# Patient Record
Sex: FEMALE | Race: WHITE | NOT HISPANIC OR LATINO | Employment: OTHER | URBAN - METROPOLITAN AREA
[De-identification: names, ages, dates, MRNs, and addresses within clinical notes are randomized per-mention and may not be internally consistent; named-entity substitution may affect disease eponyms.]

---

## 2018-08-12 ENCOUNTER — HOSPITAL ENCOUNTER (OUTPATIENT)
Facility: MEDICAL CENTER | Age: 78
DRG: 281 | End: 2018-08-12
Payer: MEDICARE

## 2018-08-12 ENCOUNTER — HOSPITAL ENCOUNTER (INPATIENT)
Facility: MEDICAL CENTER | Age: 78
LOS: 2 days | DRG: 281 | End: 2018-08-14
Attending: FAMILY MEDICINE | Admitting: FAMILY MEDICINE
Payer: MEDICARE

## 2018-08-12 PROBLEM — N18.9 ACUTE ON CHRONIC KIDNEY FAILURE (HCC): Status: ACTIVE | Noted: 2018-08-12

## 2018-08-12 PROBLEM — I21.4 NSTEMI (NON-ST ELEVATED MYOCARDIAL INFARCTION) (HCC): Status: ACTIVE | Noted: 2018-08-12

## 2018-08-12 PROBLEM — N17.9 ACUTE ON CHRONIC KIDNEY FAILURE (HCC): Status: ACTIVE | Noted: 2018-08-12

## 2018-08-12 PROBLEM — K56.609 SBO (SMALL BOWEL OBSTRUCTION) (HCC): Status: ACTIVE | Noted: 2018-08-12

## 2018-08-12 PROBLEM — I10 ESSENTIAL HYPERTENSION: Status: ACTIVE | Noted: 2018-08-12

## 2018-08-12 PROBLEM — E66.09 CLASS 2 OBESITY DUE TO EXCESS CALORIES WITHOUT SERIOUS COMORBIDITY WITH BODY MASS INDEX (BMI) OF 37.0 TO 37.9 IN ADULT: Status: ACTIVE | Noted: 2018-08-12

## 2018-08-12 LAB
APPEARANCE UR: CLEAR
APTT PPP: 115.3 SEC (ref 24.7–36)
APTT PPP: 168.4 SEC (ref 24.7–36)
APTT PPP: 40.3 SEC (ref 24.7–36)
BILIRUB UR QL STRIP.AUTO: NEGATIVE
COLOR UR: YELLOW
EKG IMPRESSION: NORMAL
EST. AVERAGE GLUCOSE BLD GHB EST-MCNC: 114 MG/DL
GLUCOSE UR STRIP.AUTO-MCNC: NEGATIVE MG/DL
HBA1C MFR BLD: 5.6 % (ref 0–5.6)
KETONES UR STRIP.AUTO-MCNC: NEGATIVE MG/DL
LEUKOCYTE ESTERASE UR QL STRIP.AUTO: NEGATIVE
MICRO URNS: NORMAL
NITRITE UR QL STRIP.AUTO: NEGATIVE
PH UR STRIP.AUTO: 5.5 [PH]
PROT UR QL STRIP: NEGATIVE MG/DL
RBC UR QL AUTO: NEGATIVE
SODIUM UR-SCNC: 126 MMOL/L
SP GR UR STRIP.AUTO: 1.01
TROPONIN I SERPL-MCNC: 0.36 NG/ML (ref 0–0.04)
TROPONIN I SERPL-MCNC: 0.36 NG/ML (ref 0–0.04)
TROPONIN I SERPL-MCNC: 0.47 NG/ML (ref 0–0.04)
UROBILINOGEN UR STRIP.AUTO-MCNC: 0.2 MG/DL

## 2018-08-12 PROCEDURE — 93010 ELECTROCARDIOGRAM REPORT: CPT | Performed by: INTERNAL MEDICINE

## 2018-08-12 PROCEDURE — A9270 NON-COVERED ITEM OR SERVICE: HCPCS | Performed by: INTERNAL MEDICINE

## 2018-08-12 PROCEDURE — 83036 HEMOGLOBIN GLYCOSYLATED A1C: CPT

## 2018-08-12 PROCEDURE — 81003 URINALYSIS AUTO W/O SCOPE: CPT | Mod: 91

## 2018-08-12 PROCEDURE — 700111 HCHG RX REV CODE 636 W/ 250 OVERRIDE (IP): Performed by: HOSPITALIST

## 2018-08-12 PROCEDURE — 84484 ASSAY OF TROPONIN QUANT: CPT | Mod: 91

## 2018-08-12 PROCEDURE — 700102 HCHG RX REV CODE 250 W/ 637 OVERRIDE(OP): Performed by: INTERNAL MEDICINE

## 2018-08-12 PROCEDURE — 770020 HCHG ROOM/CARE - TELE (206)

## 2018-08-12 PROCEDURE — 84300 ASSAY OF URINE SODIUM: CPT

## 2018-08-12 PROCEDURE — 36415 COLL VENOUS BLD VENIPUNCTURE: CPT

## 2018-08-12 PROCEDURE — A9270 NON-COVERED ITEM OR SERVICE: HCPCS | Performed by: HOSPITALIST

## 2018-08-12 PROCEDURE — 85730 THROMBOPLASTIN TIME PARTIAL: CPT

## 2018-08-12 PROCEDURE — 700102 HCHG RX REV CODE 250 W/ 637 OVERRIDE(OP): Performed by: HOSPITALIST

## 2018-08-12 PROCEDURE — 99223 1ST HOSP IP/OBS HIGH 75: CPT | Mod: AI | Performed by: HOSPITALIST

## 2018-08-12 PROCEDURE — 93005 ELECTROCARDIOGRAM TRACING: CPT | Performed by: INTERNAL MEDICINE

## 2018-08-12 PROCEDURE — 93306 TTE W/DOPPLER COMPLETE: CPT | Mod: 26 | Performed by: INTERNAL MEDICINE

## 2018-08-12 RX ORDER — ALLOPURINOL 300 MG/1
300 TABLET ORAL DAILY
Status: DISCONTINUED | OUTPATIENT
Start: 2018-08-12 | End: 2018-08-14 | Stop reason: HOSPADM

## 2018-08-12 RX ORDER — AMOXICILLIN 250 MG
2 CAPSULE ORAL 2 TIMES DAILY
Status: DISCONTINUED | OUTPATIENT
Start: 2018-08-12 | End: 2018-08-14 | Stop reason: HOSPADM

## 2018-08-12 RX ORDER — CALCITRIOL 0.25 UG/1
0.25 CAPSULE, LIQUID FILLED ORAL DAILY
Status: DISCONTINUED | OUTPATIENT
Start: 2018-08-12 | End: 2018-08-14 | Stop reason: HOSPADM

## 2018-08-12 RX ORDER — MAGNESIUM GLUCONATE 27 MG(500)
500 TABLET ORAL DAILY
COMMUNITY

## 2018-08-12 RX ORDER — ATORVASTATIN CALCIUM 40 MG/1
40 TABLET, FILM COATED ORAL EVERY EVENING
Status: DISCONTINUED | OUTPATIENT
Start: 2018-08-12 | End: 2018-08-14 | Stop reason: HOSPADM

## 2018-08-12 RX ORDER — POLYETHYLENE GLYCOL 3350 17 G/17G
1 POWDER, FOR SOLUTION ORAL
Status: DISCONTINUED | OUTPATIENT
Start: 2018-08-12 | End: 2018-08-14 | Stop reason: HOSPADM

## 2018-08-12 RX ORDER — HEPARIN SODIUM 1000 [USP'U]/ML
6000 INJECTION, SOLUTION INTRAVENOUS; SUBCUTANEOUS ONCE
Status: COMPLETED | OUTPATIENT
Start: 2018-08-12 | End: 2018-08-12

## 2018-08-12 RX ORDER — ACETAMINOPHEN 325 MG/1
650 TABLET ORAL EVERY 6 HOURS PRN
Status: DISCONTINUED | OUTPATIENT
Start: 2018-08-12 | End: 2018-08-14 | Stop reason: HOSPADM

## 2018-08-12 RX ORDER — ONDANSETRON 2 MG/ML
4 INJECTION INTRAMUSCULAR; INTRAVENOUS EVERY 4 HOURS PRN
Status: DISCONTINUED | OUTPATIENT
Start: 2018-08-12 | End: 2018-08-14 | Stop reason: HOSPADM

## 2018-08-12 RX ORDER — AMLODIPINE BESYLATE 5 MG/1
5 TABLET ORAL DAILY
Status: ON HOLD | COMMUNITY
End: 2018-08-13

## 2018-08-12 RX ORDER — TORSEMIDE 5 MG/1
5 TABLET ORAL DAILY
Status: DISCONTINUED | OUTPATIENT
Start: 2018-08-12 | End: 2018-08-12

## 2018-08-12 RX ORDER — HEPARIN SODIUM 1000 [USP'U]/ML
3200 INJECTION, SOLUTION INTRAVENOUS; SUBCUTANEOUS PRN
Status: DISCONTINUED | OUTPATIENT
Start: 2018-08-12 | End: 2018-08-13

## 2018-08-12 RX ORDER — SODIUM CHLORIDE 9 MG/ML
INJECTION, SOLUTION INTRAVENOUS CONTINUOUS
Status: DISCONTINUED | OUTPATIENT
Start: 2018-08-12 | End: 2018-08-14

## 2018-08-12 RX ORDER — BISACODYL 10 MG
10 SUPPOSITORY, RECTAL RECTAL
Status: DISCONTINUED | OUTPATIENT
Start: 2018-08-12 | End: 2018-08-14 | Stop reason: HOSPADM

## 2018-08-12 RX ORDER — ONDANSETRON 4 MG/1
4 TABLET, ORALLY DISINTEGRATING ORAL EVERY 4 HOURS PRN
Status: DISCONTINUED | OUTPATIENT
Start: 2018-08-12 | End: 2018-08-14 | Stop reason: HOSPADM

## 2018-08-12 RX ORDER — AMLODIPINE BESYLATE 5 MG/1
5 TABLET ORAL DAILY
Status: DISCONTINUED | OUTPATIENT
Start: 2018-08-12 | End: 2018-08-13

## 2018-08-12 RX ADMIN — HEPARIN SODIUM 1200 UNITS/HR: 5000 INJECTION, SOLUTION INTRAVENOUS at 08:42

## 2018-08-12 RX ADMIN — ATORVASTATIN CALCIUM 40 MG: 40 TABLET, FILM COATED ORAL at 18:26

## 2018-08-12 RX ADMIN — HEPARIN SODIUM 6000 UNITS: 1000 INJECTION, SOLUTION INTRAVENOUS; SUBCUTANEOUS at 08:42

## 2018-08-12 RX ADMIN — AMLODIPINE BESYLATE 5 MG: 5 TABLET ORAL at 06:32

## 2018-08-12 RX ADMIN — ALLOPURINOL 300 MG: 300 TABLET ORAL at 06:32

## 2018-08-12 RX ADMIN — TORSEMIDE 5 MG: 5 TABLET ORAL at 06:32

## 2018-08-12 RX ADMIN — CALCITRIOL 0.25 MCG: 0.25 CAPSULE, LIQUID FILLED ORAL at 06:32

## 2018-08-12 RX ADMIN — ASPIRIN 81 MG: 81 TABLET, DELAYED RELEASE ORAL at 12:52

## 2018-08-12 ASSESSMENT — LIFESTYLE VARIABLES
ALCOHOL_USE: YES
EVER HAD A DRINK FIRST THING IN THE MORNING TO STEADY YOUR NERVES TO GET RID OF A HANGOVER: NO
ON A TYPICAL DAY WHEN YOU DRINK ALCOHOL HOW MANY DRINKS DO YOU HAVE: 1
TOTAL SCORE: 0
HAVE YOU EVER FELT YOU SHOULD CUT DOWN ON YOUR DRINKING: NO
TOTAL SCORE: 0
HAVE PEOPLE ANNOYED YOU BY CRITICIZING YOUR DRINKING: NO
EVER FELT BAD OR GUILTY ABOUT YOUR DRINKING: NO
AVERAGE NUMBER OF DAYS PER WEEK YOU HAVE A DRINK CONTAINING ALCOHOL: 1
HOW MANY TIMES IN THE PAST YEAR HAVE YOU HAD 5 OR MORE DRINKS IN A DAY: 0
CONSUMPTION TOTAL: INCOMPLETE
HAVE PEOPLE ANNOYED YOU BY CRITICIZING YOUR DRINKING: NO
TOTAL SCORE: 0
EVER FELT BAD OR GUILTY ABOUT YOUR DRINKING: NO
TOTAL SCORE: 0
EVER_SMOKED: YES
CONSUMPTION TOTAL: NEGATIVE
ALCOHOL_USE: YES
TOTAL SCORE: 0
EVER HAD A DRINK FIRST THING IN THE MORNING TO STEADY YOUR NERVES TO GET RID OF A HANGOVER: NO
TOTAL SCORE: 0
HAVE YOU EVER FELT YOU SHOULD CUT DOWN ON YOUR DRINKING: NO

## 2018-08-12 ASSESSMENT — COGNITIVE AND FUNCTIONAL STATUS - GENERAL
SUGGESTED CMS G CODE MODIFIER MOBILITY: CK
DAILY ACTIVITIY SCORE: 20
DRESSING REGULAR LOWER BODY CLOTHING: A LITTLE
STANDING UP FROM CHAIR USING ARMS: A LITTLE
MOVING FROM LYING ON BACK TO SITTING ON SIDE OF FLAT BED: A LITTLE
CLIMB 3 TO 5 STEPS WITH RAILING: A LITTLE
TURNING FROM BACK TO SIDE WHILE IN FLAT BAD: A LITTLE
MOVING TO AND FROM BED TO CHAIR: A LITTLE
DRESSING REGULAR UPPER BODY CLOTHING: A LITTLE
SUGGESTED CMS G CODE MODIFIER DAILY ACTIVITY: CJ
TOILETING: A LITTLE
HELP NEEDED FOR BATHING: A LITTLE
MOBILITY SCORE: 18
WALKING IN HOSPITAL ROOM: A LITTLE

## 2018-08-12 ASSESSMENT — ENCOUNTER SYMPTOMS
VOMITING: 0
RESPIRATORY NEGATIVE: 1
MUSCULOSKELETAL NEGATIVE: 1
ABDOMINAL PAIN: 0
PSYCHIATRIC NEGATIVE: 1
CONSTIPATION: 0
SHORTNESS OF BREATH: 0
NEUROLOGICAL NEGATIVE: 1
DIARRHEA: 0
NAUSEA: 0

## 2018-08-12 ASSESSMENT — PAIN SCALES - GENERAL
PAINLEVEL_OUTOF10: 0

## 2018-08-12 ASSESSMENT — PATIENT HEALTH QUESTIONNAIRE - PHQ9
2. FEELING DOWN, DEPRESSED, IRRITABLE, OR HOPELESS: NOT AT ALL
1. LITTLE INTEREST OR PLEASURE IN DOING THINGS: NOT AT ALL
SUM OF ALL RESPONSES TO PHQ9 QUESTIONS 1 AND 2: 0

## 2018-08-12 NOTE — ASSESSMENT & PLAN NOTE
With no current nausea or vomiting.  We will hold NG tube, occasions.  Monitor.  Intravenous fluids.

## 2018-08-12 NOTE — PROGRESS NOTES
Pt arrived from [place] via EMS at ~0450. Report received from George PARDO. Pt A/Ox4. Denies CP, abd pain, N/V, dizziness, and SOB. VSS. Reviewed safety precautions. Call light within reach and bed alarm on.

## 2018-08-12 NOTE — CARE PLAN
Problem: Communication  Goal: The ability to communicate needs accurately and effectively will improve  Outcome: PROGRESSING AS EXPECTED  Pt able to make needs known.    Problem: Safety  Goal: Will remain free from falls  Outcome: PROGRESSING AS EXPECTED  Pt had no falls this shift.

## 2018-08-12 NOTE — H&P
Hospital Medicine History & Physical Note    Date of Service  8/12/2018    Primary Care Physician  Pcp Pt States None    Consultants  Cardiology    Code Status  Full code     Chief Complaint  Chest pain    History of Presenting Illness  77 y.o. 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  She reports the onset of not feeling well, which she describes as a chest pressure in the center of her chest, without radiation.  She denies any exacerbating or alleviating factors.  She had no nausea or vomiting.  Due to the ongoing feelings, she presented to an outside facility, where she was evaluated.  At that time, she was noted to have an elevated troponin, and requiring cardiology consultation.  She was subsequently sent to this facility for higher level of care and cardiology consultation.    Review of Systems  Review of Systems   Constitutional: Positive for malaise/fatigue.   HENT: Negative.    Respiratory: Negative.  Negative for shortness of breath.    Cardiovascular: Positive for chest pain.   Gastrointestinal: Negative for abdominal pain, constipation, diarrhea, nausea and vomiting.   Genitourinary: Negative.    Musculoskeletal: Negative.    Skin: Negative.    Neurological: Negative.    Endo/Heme/Allergies: Negative.    Psychiatric/Behavioral: Negative.        Past Medical History   has a past medical history of Cancer (HCC) and Renal disorder.    Surgical History   has a past surgical history that includes other; gyn surgery; hernia repair; other abdominal surgery; appendectomy; and cholecystectomy.     Family History  family history includes Cancer in her mother; Heart Attack in her father; Heart Disease in her father.     Social History   reports that she has quit smoking. She does not have any  smokeless tobacco history on file. She reports that she drinks alcohol. She reports that she does not use drugs.    Allergies  Allergies   Allergen Reactions   • Contrast Media With Iodine [Iodine] Swelling     Angioedema   • Penicillins Rash     Rash     • Codeine Rash     Rash     • Tape Rash     Rash         Medications  Prior to Admission Medications   Prescriptions Last Dose Informant Patient Reported? Taking?   allopurinol (ZYLOPRIM) 300 MG Tab 8/11/2018 at Unknown time  Yes No   Sig: Take 300 mg by mouth every day.   amLODIPine (NORVASC) 5 MG Tab 8/12/2018 at 0200  Yes Yes   Sig: Take 5 mg by mouth every day.   calcitRIOL (ROCALTROL) 0.25 MCG Cap 8/11/2018 at Unknown time  Yes No   Sig: Take 0.25 mcg by mouth every day.   cephALEXin (KEFLEX) 250 MG Cap 8/11/2018 at Unknown time  Yes No   Sig: Take 250 mg by mouth every day.   diphenoxylate-atropine (LOMOTIL) 2.5-0.025 MG Tab 8/11/2018 at Unknown time  Yes No   Sig: Take 2 Tabs by mouth 4 times a day as needed for Diarrhea.   magnesium gluconate (MAG-G) 500 MG tablet 8/3/18  Yes Yes   Sig: Take 500 mg by mouth every day.   torsemide (DEMADEX) 5 MG Tab 8/11/2018 at Unknown time  Yes No   Sig: Take 5 mg by mouth every day.      Facility-Administered Medications: None       Physical Exam  Blood Pressure : 158/70   Temperature: 36.2 °C (97.2 °F)   Pulse: 70   Respiration: 18   Pulse Oximetry: 98 %     Physical Exam   Constitutional: She is oriented to person, place, and time. She appears well-developed and well-nourished. No distress.   HENT:   Head: Normocephalic and atraumatic.   Eyes: Pupils are equal, round, and reactive to light. Conjunctivae are normal.   Neck: Normal range of motion. Neck supple. No tracheal deviation present. No thyromegaly present.   Cardiovascular: Normal rate, regular rhythm and normal heart sounds.  Exam reveals no gallop and no friction rub.    No murmur heard.  Pulmonary/Chest: Effort normal and breath sounds normal. No respiratory  distress. She has no wheezes. She has no rales.   Abdominal: Soft. Bowel sounds are normal. She exhibits no distension. There is no tenderness. There is no rebound.   Musculoskeletal: Normal range of motion. She exhibits no edema.   Lymphadenopathy:     She has no cervical adenopathy.   Neurological: She is alert and oriented to person, place, and time. No cranial nerve deficit.   Skin: Skin is warm and dry. She is not diaphoretic.   Psychiatric: She has a normal mood and affect.   Nursing note and vitals reviewed.      Laboratory:  Recent Labs      08/11/18 2150   WBC  10.4   RBC  4.27   HEMOGLOBIN  13.1   HEMATOCRIT  39.8   MCV  93.2   MCH  30.7   MCHC  32.9*   RDW  14.6*   PLATELETCT  215   MPV  10.9*     Recent Labs      08/11/18 2150   SODIUM  144   POTASSIUM  4.3   CHLORIDE  110*   CO2  26   GLUCOSE  136*   BUN  56*   CREATININE  2.7*   CALCIUM  9.0     Recent Labs      08/11/18 2150   ALTSGPT  17   ASTSGOT  16   ALKPHOSPHAT  55   TBILIRUBIN  0.3   LIPASE  155   GLUCOSE  136*                 Lab Results   Component Value Date    TROPONINI 1.11 () 08/12/2018       Urinalysis:    Recent Labs      08/12/18   0000   SPECGRAVITY  1.015   GLUCOSEUR  NEGATIVE   KETONES  NEGATIVE   NITRITE  NEGATIVE   LEUKESTERAS  NEGATIVE   WBCURINE  Rare   RBCURINE  0-2   BACTERIA  None Seen   EPITHELCELL  None Seen        Imaging:    Computed tomographic the abdomen and pelvis from outside psych facility  With findings consistent with a partial small bowel obstruction less likely enteritis, trace air noted in bladder which may be due to recent instrumentation infection or other etiology.  Minimal prominence the left adrenal gland likely due to hyperplasia surgically removed right kidney and adrenal gland, surgical cholecystectomy      Assessment/Plan:  I anticipate this patient will require at least two midnights for appropriate medical management, necessitating inpatient admission.    Acute on chronic kidney failure (HCC)    Assessment & Plan    Concerning given single kidney.  This may be the cause of the elevated troponin as well.  We will monitor.  Check urine studies to verify acuity.  Of note, patient is on diuretics so will obtain Feurea        Class 2 obesity due to excess calories without serious comorbidity with body mass index (BMI) of 37.0 to 37.9 in adult   Assessment & Plan    Body mass index is 37.1 kg/m².          Essential hypertension   Assessment & Plan    Controlled with current medication regimen.  Monitor.        SBO (small bowel obstruction) ([facility])   Assessment & Plan    With no current nausea or vomiting.  We will hold NG tube, occasions.  Monitor.  Intravenous fluids.        NSTEMI (non-ST elevated myocardial infarction) ([facility])   Assessment & Plan    Concern for the same, elevated troponin of 1.11, from previous, with chest pain yesterday.  Plan for heparin infusion pending cardiology consultation.            VTE prophylaxis: SCD, heparin infusion

## 2018-08-12 NOTE — CONSULTS
Cardiology Consult Note    Date & Time note created:    8/12/2018   8:32 AM       Patient ID:   Name:             Yari Gonzalez   YOB: 1940  Age:                 77 y.o.  female   MRN:               [id number]               Referring Physician: Dr. Morfin                                                  Reason for Consult:      NSTEMI    History of Present Illness:    77-year-old female with no prior cardiac history who was transferred from outside hospital for management of NSTEMI.  Patient is currently visiting from [facility].  She was driving around [place] when she started having substernal nonradiating chest pressure associated with nausea.  She then went to her hotel room where she had emesis ×1.  After that patient states that her  noted that she was choking and due to worsening symptoms, he decided to call 911.  Her nausea and chest pressure have since resolved.  She has never had these symptoms in the past.    She has a history of endometrial cancer for which she received radiation.  She has also had multiple surgeries all in the 1970s and 80s.  She also has a solitary kidney as she has previously had a nephrectomy.    Review of Systems:      A full review of systems was completed and all pertinent positives and negatives are included in the HPI above. All others reviewed and negative.     Past Medical History:   Past Medical History:   Diagnosis Date   • Cancer (HCC)     intrametrium, with mets   • Renal disorder     1 kidney       Past Surgical History:  Past Surgical History:   Procedure Laterality Date   • APPENDECTOMY     • CHOLECYSTECTOMY     • GYN SURGERY      hysterectomy   • HERNIA REPAIR     • OTHER      right kindey removed   • OTHER ABDOMINAL SURGERY         Family History:  Family History   Problem Relation Age of Onset   • Cancer Mother    • Heart Disease Father    • Heart Attack Father        Social History:  Social History     Social History   •  Marital status:      Spouse name: N/A   • Number of children: N/A   • Years of education: N/A     Occupational History   • Not on file.     Social History Main Topics   • Smoking status: Former Smoker   • Smokeless tobacco: Never Used   • Alcohol use Yes      Comment: rare   • Drug use: No   • Sexual activity: Not on file     Other Topics Concern   • Not on file     Social History Narrative   • No narrative on file   Occasional alcohol use.  No recreational drug use.    Hospital Medications:    Current Facility-Administered Medications:   •  allopurinol (ZYLOPRIM) tablet 300 mg, 300 mg, Oral, DAILY, Bryan Morfin M.D., 300 mg at 08/12/18 0632  •  amLODIPine (NORVASC) tablet 5 mg, 5 mg, Oral, DAILY, Bryan Morfin M.D., 5 mg at 08/12/18 0632  •  calcitRIOL (ROCALTROL) capsule 0.25 mcg, 0.25 mcg, Oral, DAILY, Bryan Morfin M.D., 0.25 mcg at 08/12/18 0632  •  ondansetron (ZOFRAN) syringe/vial injection 4 mg, 4 mg, Intravenous, Q4HRS PRN, Bryan Morfin M.D.  •  ondansetron (ZOFRAN ODT) dispertab 4 mg, 4 mg, Oral, Q4HRS PRN, Bryan Morfin M.D.  •  senna-docusate (PERICOLACE or SENOKOT S) 8.6-50 MG per tablet 2 Tab, 2 Tab, Oral, BID **AND** polyethylene glycol/lytes (MIRALAX) PACKET 1 Packet, 1 Packet, Oral, QDAY PRN **AND** magnesium hydroxide (MILK OF MAGNESIA) suspension 30 mL, 30 mL, Oral, QDAY PRN **AND** bisacodyl (DULCOLAX) suppository 10 mg, 10 mg, Rectal, QDAY PRN, Bryan Morfin M.D.  •  acetaminophen (TYLENOL) tablet 650 mg, 650 mg, Oral, Q6HRS PRN, Bryan Morfin M.D.  •  MD ALERT...HEPARIN WEIGHT BASED PROTOCOL Pharmacist to implement, , Other, PRN, Bryan Morfin M.D.  •  NS infusion, , Intravenous, Continuous, Bryan Morfin M.D.  •  heparin injection 6,000 Units, 6,000 Units, Intravenous, Once **AND** heparin injection 3,200 Units, 3,200 Units, Intravenous, PRN **AND** heparin infusion 25,000 units in 500 ml 0.45% nacl, , Intravenous, Continuous **AND** Protocol 440 Heparin Weight Based  "DO NOT GIVE ANY HEPARIN BOLUS TO STROKE PATIENT, , , CONTINUOUS **AND** Protocol 440 Heparin Weight Based Discontinue Enoxaparin (Lovenox), Dabigatran (Pradaxa), Rivaroxaban (Xarelto), Apixaban (Eliquis), Edoxaban (Savaysa, Lixiana), Fondaparinux (Arixtra) and Argatroban prior to heparin administration, , , CONTINUOUS **AND** Protocol 440 Heparin Weight Based Draw baseline aPTT, PT, and platelet count if not already done, , , CONTINUOUS **AND** Protocol 440 Heparin Weight Based Draw aPTT 6 hours after beginning infusion. , , , CONTINUOUS **AND** Protocol 440 Heparin Weight Based Draw Platelet count every three days. Contact MD if platelet is 50% lower than baseline count., , , CONTINUOUS **AND** Protocol 440 Heparin Weight Based Record Patient Data, , , CONTINUOUS **AND** Protocol 440 Heparin Weight Based INSTRUCTIONS, , , CONTINUOUS **AND** Protocol 440 Heparin Weight Based Review aPTT results 6 hours after infusion is begun as detailed, , , CONTINUOUS **AND** Protocol 440 Heparin Weight Based Adjust heparin to maintain aPTT between 55-96 sec, , , CONTINUOUS **AND** Protocol 440 Heparin Weight Based Order aPTT 6 hours after any rate change or hold until aPTT is therapeutic (55-96 seconds), , , CONTINUOUS **AND** Protocol 440 Heparin Weight Based Documentation and verification, , , CONTINUOUS, Bryan Morfin M.D.  •  pneumococcal vaccine (PNEUMOVAX-23) injection 25 mcg, 0.5 mL, Intramuscular, Once PRN, Bryan Morfin M.D.    Medication Allergy:  Allergies   Allergen Reactions   • Contrast Media With Iodine [Iodine] Swelling     Angioedema   • Penicillins Rash     Rash     • Codeine Rash     Rash     • Tape Rash     Rash         Physical Exam:  Vitals/ General Appearance:   Weight/BMI: Body mass index is 37.1 kg/m².  Blood pressure 158/70, pulse 70, temperature 36.2 °C (97.2 °F), resp. rate 18, height 1.6 m (5' 3\"), weight 95 kg (209 lb 7 oz), SpO2 98 %, not currently breastfeeding.  Vitals:    08/12/18 0437 " "08/12/18 0627   BP: 158/70    Pulse: 70    Resp: 18    Temp: 36.2 °C (97.2 °F)    SpO2: 98%    Weight:  95 kg (209 lb 7 oz)   Height:  1.6 m (5' 3\")       Constitutional:   Well developed, Well nourished, No acute distress   HEENT:  Normocephalic, Atraumatic  Eyes: Conjunctiva normal  Neck:  Normal range of motion, no JVD.  Cardiovascular:  Normal heart rate, Normal rhythm, No murmurs, No rubs, No gallops. Extremities with intact distal pulses, no cyanosis, or edema.   Lungs:  Normal breath sounds, breath sounds clear to auscultation bilaterally,  no crackles, no wheezing.   Abdomen:  Soft, No tenderness  Skin: No rash  Neurologic: Alert & oriented x 3   Psychiatric: Affect normal     MDM (Data Review):     Records reviewed and summarized in current documentation    Lab Data Review:  Recent Labs      08/11/18   2150   WBC  10.4   RBC  4.27   HEMOGLOBIN  13.1   HEMATOCRIT  39.8   MCV  93.2   MCH  30.7   MCHC  32.9*   RDW  14.6*   PLATELETCT  215   MPV  10.9*     Recent Labs      08/11/18   2150   SODIUM  144   POTASSIUM  4.3   CHLORIDE  110*   CO2  26   GLUCOSE  136*   BUN  56*   CREATININE  2.7*   CALCIUM  9.0     Recent Labs      08/11/18 2150  08/12/18   0015   TROPONINI  1.02*  1.11*       Labs reviewed as noted above.  Creatinine 2.7, stable from 2011.    Imaging/Procedures Review:      EKG: Performed on admission was personally reviewed and showed sinus rhythm at 66 bpm.  Q waves in III and aVF.  No ischemic ST-T wave changes.    MDM (Assessment and Plan):     Active Hospital Problems    Diagnosis   • NSTEMI (non-ST elevated myocardial infarction) ([facility]) [I21.4]   • SBO (small bowel obstruction) ([facility]) [K56.609]   • Essential hypertension [I10]   • Class 2 obesity due to excess calories without serious comorbidity with body mass index (BMI) of 37.0 to 37.9 in adult [E66.09, Z68.37]   • Acute on chronic kidney failure (HCC) [N17.9, N18.9]     NSTEMI: Patient is currently symptom-free.  EKG without any ischemic " changes.  NSTEMI could be type I versus type II, unclear from history.  Troponin only mildly elevated.  She has baseline CKD and her creatinine is around 2.7.  I would recommend a myocardial perfusion study tomorrow morning to evaluate for ischemia.  If she has a large reversible defect on the above study, we will need to discuss risks and benefits of a coronary angiogram given her CKD.  Otherwise would recommend medical management only.  She is currently on heparin drip.  Will start aspirin and a statin as well.  Myocardial perfusion study ordered for tomorrow.  Patient will be n.p.o. after midnight.  An echocardiogram is also been ordered to evaluate her LV function.    Will follow    Thank you for allowing me to participate in the care of this patient. Please do not hesitate to contact me with any questions.    Marissa Ramirez MD  Cardiologist  [facility]

## 2018-08-12 NOTE — ASSESSMENT & PLAN NOTE
Concern for the same, elevated troponin of 1.11, from previous, with chest pain yesterday.  Plan for heparin infusion pending cardiology consultation.  8/13:  dc'd heparin drip, dc home per Dr. Hdez with medical management of NSTEMI.  Asa, lipitor, norvasc, coreg.  SR 73-75.  Echo eF 55%, trops trended down.  No further chest pressure.

## 2018-08-12 NOTE — PROGRESS NOTES
Direct admit from [facility], Dr. Eid, [phone].  Accepted by Dr. Conner for NSTEMI and SBO.  ADT signed & held @ 5103, needs to be released upon pt arrival.  No written orders received.  Pt coming by ground EMS.

## 2018-08-12 NOTE — ASSESSMENT & PLAN NOTE
Concerning given single kidney.  This may be the cause of the elevated troponin as well.  We will monitor.  Check urine studies to verify acuity.  On chronic daily torsemide, would take PRN.  Follow up home nephrologist.

## 2018-08-13 ENCOUNTER — APPOINTMENT (OUTPATIENT)
Dept: RADIOLOGY | Facility: MEDICAL CENTER | Age: 78
DRG: 281 | End: 2018-08-13
Attending: HOSPITALIST
Payer: MEDICARE

## 2018-08-13 PROBLEM — Z90.5 S/P NEPHRECTOMY: Status: ACTIVE | Noted: 2018-08-13

## 2018-08-13 PROBLEM — N18.4 ACUTE RENAL FAILURE SUPERIMPOSED ON STAGE 4 CHRONIC KIDNEY DISEASE (HCC): Status: ACTIVE | Noted: 2018-08-12

## 2018-08-13 LAB
ANION GAP SERPL CALC-SCNC: 8 MMOL/L (ref 0–11.9)
APTT PPP: 81.3 SEC (ref 24.7–36)
APTT PPP: 93.7 SEC (ref 24.7–36)
BASOPHILS # BLD AUTO: 0.4 % (ref 0–1.8)
BASOPHILS # BLD: 0.03 K/UL (ref 0–0.12)
BUN SERPL-MCNC: 40 MG/DL (ref 8–22)
CALCIUM SERPL-MCNC: 8.9 MG/DL (ref 8.5–10.5)
CHLORIDE SERPL-SCNC: 114 MMOL/L (ref 96–112)
CO2 SERPL-SCNC: 20 MMOL/L (ref 20–33)
CREAT SERPL-MCNC: 2.07 MG/DL (ref 0.5–1.4)
EOSINOPHIL # BLD AUTO: 0.17 K/UL (ref 0–0.51)
EOSINOPHIL NFR BLD: 2.4 % (ref 0–6.9)
ERYTHROCYTE [DISTWIDTH] IN BLOOD BY AUTOMATED COUNT: 53.1 FL (ref 35.9–50)
GLUCOSE SERPL-MCNC: 109 MG/DL (ref 65–99)
HCT VFR BLD AUTO: 38.1 % (ref 37–47)
HGB BLD-MCNC: 11.9 G/DL (ref 12–16)
IMM GRANULOCYTES # BLD AUTO: 0.02 K/UL (ref 0–0.11)
IMM GRANULOCYTES NFR BLD AUTO: 0.3 % (ref 0–0.9)
LV EJECT FRACT  99904: 55
LV EJECT FRACT MOD 2C 99903: 55.18
LV EJECT FRACT MOD 4C 99902: 47.28
LV EJECT FRACT MOD BP 99901: 51.84
LYMPHOCYTES # BLD AUTO: 1.39 K/UL (ref 1–4.8)
LYMPHOCYTES NFR BLD: 19.3 % (ref 22–41)
MCH RBC QN AUTO: 31 PG (ref 27–33)
MCHC RBC AUTO-ENTMCNC: 31.2 G/DL (ref 33.6–35)
MCV RBC AUTO: 99.2 FL (ref 81.4–97.8)
MONOCYTES # BLD AUTO: 0.37 K/UL (ref 0–0.85)
MONOCYTES NFR BLD AUTO: 5.1 % (ref 0–13.4)
NEUTROPHILS # BLD AUTO: 5.21 K/UL (ref 2–7.15)
NEUTROPHILS NFR BLD: 72.5 % (ref 44–72)
NRBC # BLD AUTO: 0 K/UL
NRBC BLD-RTO: 0 /100 WBC
PLATELET # BLD AUTO: 177 K/UL (ref 164–446)
PMV BLD AUTO: 10.9 FL (ref 9–12.9)
POTASSIUM SERPL-SCNC: 4 MMOL/L (ref 3.6–5.5)
RBC # BLD AUTO: 3.84 M/UL (ref 4.2–5.4)
SODIUM SERPL-SCNC: 142 MMOL/L (ref 135–145)
WBC # BLD AUTO: 7.2 K/UL (ref 4.8–10.8)

## 2018-08-13 PROCEDURE — 700111 HCHG RX REV CODE 636 W/ 250 OVERRIDE (IP)

## 2018-08-13 PROCEDURE — 700102 HCHG RX REV CODE 250 W/ 637 OVERRIDE(OP): Performed by: HOSPITALIST

## 2018-08-13 PROCEDURE — A9502 TC99M TETROFOSMIN: HCPCS

## 2018-08-13 PROCEDURE — 74019 RADEX ABDOMEN 2 VIEWS: CPT

## 2018-08-13 PROCEDURE — A9270 NON-COVERED ITEM OR SERVICE: HCPCS | Performed by: INTERNAL MEDICINE

## 2018-08-13 PROCEDURE — 36415 COLL VENOUS BLD VENIPUNCTURE: CPT

## 2018-08-13 PROCEDURE — A9270 NON-COVERED ITEM OR SERVICE: HCPCS | Performed by: HOSPITALIST

## 2018-08-13 PROCEDURE — 770020 HCHG ROOM/CARE - TELE (206)

## 2018-08-13 PROCEDURE — 93306 TTE W/DOPPLER COMPLETE: CPT

## 2018-08-13 PROCEDURE — 85025 COMPLETE CBC W/AUTO DIFF WBC: CPT

## 2018-08-13 PROCEDURE — 85730 THROMBOPLASTIN TIME PARTIAL: CPT

## 2018-08-13 PROCEDURE — 700102 HCHG RX REV CODE 250 W/ 637 OVERRIDE(OP): Performed by: INTERNAL MEDICINE

## 2018-08-13 PROCEDURE — 80048 BASIC METABOLIC PNL TOTAL CA: CPT

## 2018-08-13 PROCEDURE — 700105 HCHG RX REV CODE 258: Performed by: HOSPITALIST

## 2018-08-13 PROCEDURE — 99233 SBSQ HOSP IP/OBS HIGH 50: CPT | Performed by: HOSPITALIST

## 2018-08-13 PROCEDURE — 700111 HCHG RX REV CODE 636 W/ 250 OVERRIDE (IP): Performed by: HOSPITALIST

## 2018-08-13 RX ORDER — AMLODIPINE BESYLATE 10 MG/1
10 TABLET ORAL DAILY
Status: DISCONTINUED | OUTPATIENT
Start: 2018-08-13 | End: 2018-08-14 | Stop reason: HOSPADM

## 2018-08-13 RX ORDER — CARVEDILOL 3.12 MG/1
3.12 TABLET ORAL 2 TIMES DAILY WITH MEALS
Status: DISCONTINUED | OUTPATIENT
Start: 2018-08-13 | End: 2018-08-14 | Stop reason: HOSPADM

## 2018-08-13 RX ORDER — ASPIRIN 81 MG/1
81 TABLET ORAL DAILY
Qty: 30 TAB | Refills: 3 | Status: SHIPPED | OUTPATIENT
Start: 2018-08-14

## 2018-08-13 RX ORDER — ATORVASTATIN CALCIUM 40 MG/1
40 TABLET, FILM COATED ORAL EVERY EVENING
Qty: 30 TAB | Refills: 3 | Status: SHIPPED | OUTPATIENT
Start: 2018-08-14

## 2018-08-13 RX ORDER — CARVEDILOL 3.12 MG/1
3.12 TABLET ORAL 2 TIMES DAILY WITH MEALS
Qty: 60 TAB | Refills: 3 | Status: SHIPPED | OUTPATIENT
Start: 2018-08-14

## 2018-08-13 RX ORDER — AMLODIPINE BESYLATE 10 MG/1
10 TABLET ORAL DAILY
Qty: 30 TAB | Refills: 3 | Status: SHIPPED | OUTPATIENT
Start: 2018-08-13

## 2018-08-13 RX ORDER — REGADENOSON 0.08 MG/ML
INJECTION, SOLUTION INTRAVENOUS
Status: COMPLETED
Start: 2018-08-13 | End: 2018-08-13

## 2018-08-13 RX ADMIN — CALCITRIOL 0.25 MCG: 0.25 CAPSULE, LIQUID FILLED ORAL at 04:58

## 2018-08-13 RX ADMIN — ALLOPURINOL 300 MG: 300 TABLET ORAL at 04:58

## 2018-08-13 RX ADMIN — REGADENOSON 0.4 MG: 0.08 INJECTION, SOLUTION INTRAVENOUS at 11:24

## 2018-08-13 RX ADMIN — SODIUM CHLORIDE: 9 INJECTION, SOLUTION INTRAVENOUS at 13:22

## 2018-08-13 RX ADMIN — CARVEDILOL 3.12 MG: 3.12 TABLET, FILM COATED ORAL at 17:02

## 2018-08-13 RX ADMIN — AMLODIPINE BESYLATE 10 MG: 10 TABLET ORAL at 04:58

## 2018-08-13 RX ADMIN — HEPARIN SODIUM 1050 UNITS/HR: 5000 INJECTION, SOLUTION INTRAVENOUS at 04:07

## 2018-08-13 RX ADMIN — ASPIRIN 81 MG: 81 TABLET, DELAYED RELEASE ORAL at 04:58

## 2018-08-13 RX ADMIN — ATORVASTATIN CALCIUM 40 MG: 40 TABLET, FILM COATED ORAL at 17:02

## 2018-08-13 RX ADMIN — CARVEDILOL 3.12 MG: 3.12 TABLET, FILM COATED ORAL at 13:21

## 2018-08-13 ASSESSMENT — PAIN SCALES - GENERAL
PAINLEVEL_OUTOF10: 0

## 2018-08-13 NOTE — CARE PLAN
Problem: Safety  Goal: Will remain free from injury  Outcome: PROGRESSING AS EXPECTED  Call light within reach with education and demonstration with teach back on calling for assistance, bed in lowest locked position, hourly rounding in place. Tele box on and rhythm verified. Bed alarm on. Treaded socks on. Appropriate signs placed. Mobility assessed.     Problem: Venous Thromboembolism (VTW)/Deep Vein Thrombosis (DVT) Prevention:  Goal: Patient will participate in Venous Thrombosis (VTE)/Deep Vein Thrombosis (DVT)Prevention Measures  Outcome: PROGRESSING AS EXPECTED   08/13/18 0800   Mechanical/VTE Prophylaxis   Mechanical Prophylaxis  SCDs, Sequential Compression Device   SCDs, Sequential Compression Device Refused   OTHER   Risk Assessment Score 1   VTE RISK Moderate

## 2018-08-13 NOTE — PROGRESS NOTES
Bedside report completed with Patricia COLINDRES. Heparin drip rate verified. Reviewed POC and safety precautions with pt. Call light within reach.

## 2018-08-13 NOTE — PROGRESS NOTES
"Cardiology Follow-up Consult Note    Date of Service:    8/13/2018      Consulting Physician: Poornima Pereira M.D.    Patient ID:    Name:   Yari Gonzalez   YOB: 1940  Age:   77 y.o.  female   MRN:   [id number]      Reason for Consultation: NSTEMI    HPI/Patient ID:    78 yo F with a history of endometrial cancer s/p radiation, previous left leg DVT, nephrectomy, CKD, hypertension, and obesity who presented on 8/12/2018 with NSTEMI    Interim Events:  # No further chest pain, Patient denies chest pain, shortness of breath, palpitations, lightheadedness, melena, BRBPR, hematemesis, cough, new numbness, tingling, or focal weakness.   # tele reviewed, NSR, rare PVCs  # hypertension overnight  # renal function improved        Past medical, surgical, social, and family history reviewed and unchanged from admission except as noted in assessment and plan.    Medications: Reviewed in MAR      Allergies   Allergen Reactions   • Contrast Media With Iodine [Iodine] Swelling     Angioedema   • Penicillins Rash     Rash     • Codeine Rash     Rash     • Tape Rash     Rash             Physical Exam  Body mass index is 36.71 kg/m².  Blood pressure 159/86, pulse 70, temperature 36.3 °C (97.3 °F), resp. rate 18, height 1.6 m (5' 3\"), weight 94 kg (207 lb 3.7 oz), SpO2 94 %, not currently breastfeeding.  Vitals:    08/12/18 2030 08/13/18 0000 08/13/18 0400 08/13/18 0602   BP: 157/80 (!) 165/89  159/86   Pulse: 72 73 82 70   Resp:  17 18    Temp:  36.2 °C (97.1 °F) 36.3 °C (97.3 °F)    SpO2:  94% 94%    Weight:    94 kg (207 lb 3.7 oz)   Height:         Oxygen Therapy:  Pulse Oximetry: 94 %, O2 (LPM): 0, O2 Delivery: None (Room Air)    General: No apparent distress  Eyes: nl conjunctiva  ENT: OP clear  Neck: JVP <6 cm H2O, no carotid bruits  Lungs: normal respiratory effort, CTAB  Heart: RRR, 2/6 systolic murmur, no rubs or gallops, trace left lower extremities edema. No LV/RV heave on cardiac palpatation. 2+ " bilateral radial pulses.  2+ bilateral dp pulses.   Abdomen: soft, non tender, non distended, no masses, normal bowel sounds.  No HSM.  Extremities/MSK: no clubbing, no cyanosis  Neurological: No focal sensory deficits  Psychiatric: Appropriate affect, A/O x 3  Skin: Warm extremities      Labs (personally reviewed and notable for):   Lab Results   Component Value Date/Time    SODIUM 142 08/13/2018 03:19 AM    POTASSIUM 4.0 08/13/2018 03:19 AM    CHLORIDE 114 (H) 08/13/2018 03:19 AM    CO2 20 08/13/2018 03:19 AM    GLUCOSE 109 (H) 08/13/2018 03:19 AM    BUN 40 (H) 08/13/2018 03:19 AM    CREATININE 2.07 (H) 08/13/2018 03:19 AM      Lab Results   Component Value Date/Time    WBC 7.2 08/13/2018 03:19 AM    RBC 3.84 (L) 08/13/2018 03:19 AM    HEMOGLOBIN 11.9 (L) 08/13/2018 03:19 AM    HEMATOCRIT 38.1 08/13/2018 03:19 AM    MCV 99.2 (H) 08/13/2018 03:19 AM    MCH 31.0 08/13/2018 03:19 AM    MCHC 31.2 (L) 08/13/2018 03:19 AM    MPV 10.9 08/13/2018 03:19 AM    NEUTSPOLYS 72.50 (H) 08/13/2018 03:19 AM    LYMPHOCYTES 19.30 (L) 08/13/2018 03:19 AM    MONOCYTES 5.10 08/13/2018 03:19 AM    EOSINOPHILS 2.40 08/13/2018 03:19 AM    BASOPHILS 0.40 08/13/2018 03:19 AM            Cardiac Imaging and Procedures Review:    EKG dated 8/12/2018:   My personal interpretation is NSR, probably inferior infarct    Echo dated 2011:   CONCLUSIONS  Normal left ventricular size, thickness, systolic function. Grade I   diastolic dysfunction is present. Left ventricular ejection fraction is   60% to 65%.  Normal right ventricular size and function.   The aortic valve is structurally and functionally normal.   Normal aortic root for body surface area.     Echo: by my review, LVEF 60%, inferior wall hypokinesis.     Radiology test Review:  CXR: No acute cardiopulmonary disease.     Nuclear stress test: by my review, very little reversible ischemia. Inferior scar.       Impression and Medical Decision Making:  # NSTEMI, peak trop 1.1. Given minimal  reversibility, and the risk of contrast induced kidney injury with catheterization, will treat medically at this time.   # SBO   # Hypertension  # Obesity  # CKD stage IV, eGFR 23, solitary kidney      Recommendations:  # continue aspirin, lipitor, norvasc  # start coreg 3.125mg PO bid, uptitrate as outpatient  # continue outpatient torsemide.  #  Ok for discharge from the cardiology stand point when SBP is well controlled (<140). Will schedule outpatient cardiology f/u.     Discussed with Dr. Pereira.       Thank you for allowing me to participate in the care of this patient, cardiology will sign off.  Please contact me with any questions.      Bryan Hdez MD  Cardiologist, [facility] [place]   [phone]

## 2018-08-13 NOTE — PROGRESS NOTES
2 RN skin check completed. Back of head, ears, shoulders, elbows, wrists, spinal column, sacrum, knees, and heels all assessed.    Bruise on R forearm.  Redness that blanches on sacrum.  2+ pitting edema BLE.  2 brown papules located between L groin and leg.  Large raised mass on back  Heels red and blanching.

## 2018-08-13 NOTE — CARE PLAN
Problem: Communication  Goal: The ability to communicate needs accurately and effectively will improve  Outcome: PROGRESSING AS EXPECTED  Pt calling appropriately and able to make needs known.    Problem: Mobility  Goal: Risk for activity intolerance will decrease  Outcome: PROGRESSING SLOWER THAN EXPECTED  Pt up to bathroom with assist x1. Wide based staggering gait. Offered walker but pt refused. Educated on risk for falls.

## 2018-08-13 NOTE — PROGRESS NOTES
Patient seen, agree that symptoms are c/w cardiac symptoms.  Felt fullness like gas/pressure in left lower parasternal.  trops 1.0.  Patient is staying for further cardiac stress testing.

## 2018-08-13 NOTE — PROGRESS NOTES
Bedside report received from night shift RN, assumed pt care. Reviewed plan of care with pt. Tele box on and rhythm verified.  Chart and labs reviewed.  Bed in lowest position, call light within reach. Hourly rounding in place. Educated about calling for assistance.

## 2018-08-13 NOTE — PROGRESS NOTES
Pt BP has been running high. -160's, and as high as 180's on day shift. No PRN antihypertensives ordered. Dr. Aleman notified. Increase morning amlodipine to 10mg from 5mg. Call if SBP >180.

## 2018-08-14 VITALS
BODY MASS INDEX: 36.8 KG/M2 | SYSTOLIC BLOOD PRESSURE: 174 MMHG | HEIGHT: 63 IN | TEMPERATURE: 96.9 F | DIASTOLIC BLOOD PRESSURE: 77 MMHG | WEIGHT: 207.67 LBS | OXYGEN SATURATION: 93 % | HEART RATE: 70 BPM | RESPIRATION RATE: 17 BRPM

## 2018-08-14 PROCEDURE — 700102 HCHG RX REV CODE 250 W/ 637 OVERRIDE(OP): Performed by: INTERNAL MEDICINE

## 2018-08-14 PROCEDURE — 99239 HOSP IP/OBS DSCHRG MGMT >30: CPT | Performed by: HOSPITALIST

## 2018-08-14 PROCEDURE — A9270 NON-COVERED ITEM OR SERVICE: HCPCS | Performed by: INTERNAL MEDICINE

## 2018-08-14 PROCEDURE — A9270 NON-COVERED ITEM OR SERVICE: HCPCS | Performed by: HOSPITALIST

## 2018-08-14 PROCEDURE — 700102 HCHG RX REV CODE 250 W/ 637 OVERRIDE(OP): Performed by: HOSPITALIST

## 2018-08-14 RX ADMIN — CALCITRIOL 0.25 MCG: 0.25 CAPSULE, LIQUID FILLED ORAL at 05:04

## 2018-08-14 RX ADMIN — CARVEDILOL 3.12 MG: 3.12 TABLET, FILM COATED ORAL at 05:03

## 2018-08-14 RX ADMIN — ASPIRIN 81 MG: 81 TABLET, DELAYED RELEASE ORAL at 05:04

## 2018-08-14 RX ADMIN — ALLOPURINOL 300 MG: 300 TABLET ORAL at 05:03

## 2018-08-14 RX ADMIN — AMLODIPINE BESYLATE 10 MG: 10 TABLET ORAL at 05:03

## 2018-08-14 ASSESSMENT — ENCOUNTER SYMPTOMS
HEADACHES: 0
SPUTUM PRODUCTION: 0
SPEECH CHANGE: 0
SHORTNESS OF BREATH: 0
NAUSEA: 0
COUGH: 0
PALPITATIONS: 0
DIARRHEA: 0
CHILLS: 0
HEMOPTYSIS: 0
EYE DISCHARGE: 0
ABDOMINAL PAIN: 0
SORE THROAT: 0
CONSTIPATION: 0
EYE PAIN: 0
MYALGIAS: 0
DEPRESSION: 0
WHEEZING: 0
WEAKNESS: 0
DIZZINESS: 0
BACK PAIN: 0
LOSS OF CONSCIOUSNESS: 0
FEVER: 0
DIAPHORESIS: 0
CLAUDICATION: 0
SENSORY CHANGE: 0
FOCAL WEAKNESS: 0
VOMITING: 0
NECK PAIN: 0
BRUISES/BLEEDS EASILY: 0

## 2018-08-14 ASSESSMENT — LIFESTYLE VARIABLES: SUBSTANCE_ABUSE: 0

## 2018-08-14 ASSESSMENT — PAIN SCALES - GENERAL
PAINLEVEL_OUTOF10: 0
PAINLEVEL_OUTOF10: 0

## 2018-08-14 NOTE — DISCHARGE SUMMARY
Discharge Summary    CHIEF COMPLAINT ON ADMISSION  N/V/sternal fullness/pressure    Reason for Admission  NSTEMI     Admission Date  8/12/2018    CODE STATUS  Full Code    HPI & HOSPITAL COURSE  This is a 77 y.o. 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  She reports the onset of not feeling well, which she describes as a chest pressure in the center of her chest, without radiation.  She said it felt like club soda that she could not relieve.  The symptoms occurred at rest while sitting in back seat of car driving around.  She denies any exacerbating or alleviating factors.  She had no nausea or vomiting.  Due to the ongoing feelings, she presented to [facility], where she was evaluated.  At that time, she was noted to have an elevated troponin, and requiring cardiology consultation.  She was subsequently sent to this facility for higher level of care and cardiology consultation.  Her troponin was elevated at max of 1.11 with subsequent drop to 0.36. Her EKGs showed NSTEMI.  Her symptoms subsided, NM stress testing had partially reversible infarcts seen.  Dr. Bryan Hdez evaluated and due to the severe kidney disease and 1 remaining kidney, opted for medical treatment of her NSTEMI.  Her blood pressure was noted to be elevated despite increase of her norvasc from 5 to 10mg po daily.  She was started on lipitor, coreg in addition to norvasc and ASA.  Her EF was normal at 55%.  Her torsemide should be on a PRN basis if increase in weight of 3# take torsemide.  Her abdominal symptoms improved, repeat abdominal xrays normal.  She was eating and drinking normally.  She was felt safe for dc home with family to [place], then home to [place].  SHe was  on RA, VSS, SR on monitor 73-75. Cr improved from 2.7 to 2.07 with holding torsemide overnight.       Therefore, she is discharged in good and stable condition to home with close outpatient follow-up.    The patient recovered much more quickly than anticipated on admission.    Discharge Date  8/13/2018    FOLLOW UP ITEMS POST DISCHARGE  Follow up with Dr. Bryan Hdez or cardiologist in [place] in 2 weeks for recheck.    Follow up with own nephrologist in 3 weeks.    DISCHARGE DIAGNOSES  Active Problems:    NSTEMI (non-ST elevated myocardial infarction) (HCC) POA: Yes    Essential hypertension POA: Yes    Class 2 obesity due to excess calories without serious comorbidity with body mass index (BMI) of 37.0 to 37.9 in adult POA: Yes    Acute renal failure superimposed on stage 4 chronic kidney disease (HCC) POA: Yes    S/p nephrectomy POA: Yes  Resolved Problems:    * No resolved hospital problems. *      FOLLOW UP  No future appointments.  No follow-up provider specified.    MEDICATIONS ON DISCHARGE     Medication List      START taking these medications      Instructions   aspirin 81 MG EC tablet   Take 1 Tab by mouth every day.  Dose:  81 mg     atorvastatin 40 MG Tabs  Commonly known as:  LIPITOR   Take 1 Tab by mouth every evening.  Dose:  40 mg     carvedilol 3.125 MG Tabs  Commonly known as:  COREG   Take 1 Tab by mouth 2 times a day, with meals.  Dose:  3.125 mg        CHANGE how you take these medications      Instructions   amLODIPine 10 MG Tabs  What changed:  · medication strength  · how much to take  Commonly known as:  NORVASC   Take 1 Tab by mouth every day.  Dose:  10 mg        CONTINUE taking these medications      Instructions   allopurinol 300 MG Tabs  Commonly known as:  ZYLOPRIM   Take 300 mg by mouth every day.  Dose:  300 mg     calcitRIOL 0.25 MCG Caps  Commonly known as:  ROCALTROL   Take 0.25 mcg by mouth every day.  Dose:  0.25 mcg     magnesium gluconate 500 MG tablet  Commonly known as:   MAG-G   Take 500 mg by mouth every day.  Dose:  500 mg        STOP taking these medications    cephALEXin 250 MG Caps  Commonly known as:  KEFLEX     diphenoxylate-atropine 2.5-0.025 MG Tabs  Commonly known as:  LOMOTIL     torsemide 5 MG Tabs  Commonly known as:  DEMADEX            Allergies  Allergies   Allergen Reactions   • Contrast Media With Iodine [Iodine] Swelling     Angioedema   • Penicillins Rash     Rash     • Codeine Rash     Rash     • Tape Rash     Rash         DIET  Orders Placed This Encounter   Procedures   • Diet Order Renal, Cardiac     Standing Status:   Standing     Number of Occurrences:   1     Order Specific Question:   Diet:     Answer:   Renal [8]     Order Specific Question:   Diet:     Answer:   Cardiac [6]       ACTIVITY  As tolerated.  Weight bearing as tolerated    CONSULTATIONS  Dr. Ramirez    PROCEDURES    Transthoracic  Echo Report      Echocardiography Laboratory    CONCLUSIONS  Left ventricular ejection fraction is visually estimated to be 55%.  Hypokinesis of the inferior wall.  Normal inferior vena cava size and inspiratory collapse.  Mild aortic and mitral regurgitation.  Ascending aorta is borderline dilated with a diameter of 3.8 cm.      ALBA NINO  Exam Date:         08/13/2018                Myocardial Perfusion   Report   NUCLEAR IMAGING INTERPRETATION   Partially reversible defects most consistent with infarcts with alberto-infarct    ischemia.      ALBA NINO     MRN:    [id number]         Gender:    F     Exam Date: 08/13/2018 10:38    LABORATORY  Lab Results   Component Value Date    SODIUM 142 08/13/2018    POTASSIUM 4.0 08/13/2018    CHLORIDE 114 (H) 08/13/2018    CO2 20 08/13/2018    GLUCOSE 109 (H) 08/13/2018    BUN 40 (H) 08/13/2018    CREATININE 2.07 (H) 08/13/2018        Lab Results   Component Value Date    WBC 7.2 08/13/2018    HEMOGLOBIN 11.9 (L) 08/13/2018    HEMATOCRIT 38.1 08/13/2018    PLATELETCT 177 08/13/2018        Total time of the discharge  process exceeds 50 minutes.

## 2018-08-14 NOTE — PROGRESS NOTES
[facility] Hospitalist Progress Note    Date of Service: 8/13/2018    Chief Complaint  77 y.o. 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  She reports the onset of not feeling well, which she describes as a chest pressure in the center of her chest, without radiation.  She denies any exacerbating or alleviating factors.  She had no nausea or vomiting.  Due to the ongoing feelings, she presented to [facility], where she was evaluated.  At that time, she was noted to have an elevated troponin, and requiring cardiology consultation.  She was subsequently sent to this facility for higher level of care and cardiology consultation.  Cardiology initiated heparin drip with NM stresss test in am.    Interval Problem Update  8/13:  + NM stress test with partially reversible defects.  Per Dr. 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  Dc heparin drip. Dc orders and summary done.    Consultants/Specialty  Dr. Hdez/cardiology    Disposition  Home with family to [place], [place].        Review of Systems   Constitutional: Negative for chills, diaphoresis, fever and malaise/fatigue.   HENT: Negative for congestion and sore throat.    Eyes: Negative for pain and discharge.   Respiratory: Negative for cough, hemoptysis, sputum production, shortness of breath and wheezing.    Cardiovascular: Negative for chest pain, palpitations, claudication and leg swelling.   Gastrointestinal: Negative for abdominal pain, constipation, diarrhea, melena, nausea and vomiting.   Genitourinary: Negative for dysuria, frequency and urgency.   Musculoskeletal: Negative for back pain, joint pain, myalgias and neck pain.   Skin: Negative for itching and rash.   Neurological: Negative for dizziness, sensory change, speech change, focal weakness, loss of  consciousness, weakness and headaches.   Endo/Heme/Allergies: Does not bruise/bleed easily.   Psychiatric/Behavioral: Negative for depression, substance abuse and suicidal ideas.      Physical Exam  Laboratory/Imaging   Hemodynamics  Temp (24hrs), Av.8 °C (98.2 °F), Min:36.3 °C (97.3 °F), Max:37.1 °C (98.7 °F)   Temperature: 36.4 °C (97.5 °F)  Pulse  Av.6  Min: 67  Max: 83    Blood Pressure : (!) 194/96 (RN notified)      Respiratory      Respiration: 20, Pulse Oximetry: 93 %        RUL Breath Sounds: Clear, RML Breath Sounds: Clear, RLL Breath Sounds: Diminished, VIPIN Breath Sounds: Clear, LLL Breath Sounds: Diminished    Fluids    Intake/Output Summary (Last 24 hours) at 18 0118  Last data filed at 18   Gross per 24 hour   Intake             2046 ml   Output              400 ml   Net             1646 ml       Nutrition  Orders Placed This Encounter   Procedures   • Diet Order Renal, Cardiac     Standing Status:   Standing     Number of Occurrences:   1     Order Specific Question:   Diet:     Answer:   Renal [8]     Order Specific Question:   Diet:     Answer:   Cardiac [6]     Physical Exam   Constitutional: She is oriented to person, place, and time. She appears well-developed and well-nourished. No distress.   HENT:   Head: Normocephalic and atraumatic.   Nose: Nose normal.   Mouth/Throat: Oropharynx is clear and moist. No oropharyngeal exudate.   Eyes: Pupils are equal, round, and reactive to light. Conjunctivae and EOM are normal. Right eye exhibits no discharge. Left eye exhibits no discharge. No scleral icterus.   Neck: Normal range of motion. Neck supple. No JVD present. No tracheal deviation present. No thyromegaly present.   Cardiovascular: Normal rate, regular rhythm, normal heart sounds and intact distal pulses.  Exam reveals no gallop and no friction rub.    No murmur heard.  Pulmonary/Chest: Effort normal and breath sounds normal. No stridor. No respiratory distress. She has  no wheezes. She has no rales. She exhibits no tenderness.   Abdominal: Soft. Bowel sounds are normal. She exhibits no distension and no mass. There is no tenderness. There is no rebound and no guarding.   Musculoskeletal: Normal range of motion. She exhibits no edema or tenderness.   Lymphadenopathy:     She has no cervical adenopathy.   Neurological: She is alert and oriented to person, place, and time. No cranial nerve deficit. She exhibits normal muscle tone. Coordination normal.   Skin: Skin is warm and dry. No rash noted. She is not diaphoretic. No erythema.   Psychiatric: She has a normal mood and affect. Her behavior is normal. Judgment and thought content normal.   Nursing note and vitals reviewed.      Recent Labs      08/11/18 2150 08/13/18 0319   WBC  10.4  7.2   RBC  4.27  3.84*   HEMOGLOBIN  13.1  11.9*   HEMATOCRIT  39.8  38.1   MCV  93.2  99.2*   MCH  30.7  31.0   MCHC  32.9*  31.2*   RDW  14.6*  53.1*   PLATELETCT  215  177   MPV  10.9*  10.9     Recent Labs      08/11/18 2150 08/13/18 0319   SODIUM  144  142   POTASSIUM  4.3  4.0   CHLORIDE  110*  114*   CO2  26  20   GLUCOSE  136*  109*   BUN  56*  40*   CREATININE  2.7*  2.07*   CALCIUM  9.0  8.9     Recent Labs      08/12/18 2052 08/13/18 0319 08/13/18   0913   APTT  115.3*  81.3*  93.7*                  Assessment/Plan     S/p nephrectomy- (present on admission)   Assessment & Plan    Right nephrectomy 2/2 staghorn calculi complications.        Acute renal failure superimposed on stage 4 chronic kidney disease (HCC)- (present on admission)   Assessment & Plan    Concerning given single kidney.  This may be the cause of the elevated troponin as well.  We will monitor.  Check urine studies to verify acuity.  On chronic daily torsemide, would take PRN.  Follow up home nephrologist.          Class 2 obesity due to excess calories without serious comorbidity with body mass index (BMI) of 37.0 to 37.9 in adult- (present on admission)    Assessment & Plan    Body mass index is 37.1 kg/m².          Essential hypertension- (present on admission)   Assessment & Plan    Controlled with current medication regimen.  Monitor.        NSTEMI (non-ST elevated myocardial infarction) (HCC)- (present on admission)   Assessment & Plan    Concern for the same, elevated troponin of 1.11, from previous, with chest pain yesterday.  Plan for heparin infusion pending cardiology consultation.  8/13:  dc'd heparin drip, dc home per Dr. Hdez with medical management of NSTEMI.  Asa, lipitor, norvasc, coreg.  SR 73-75.  Echo eF 55%, trops trended down.  No further chest pressure.          Quality-Core Measures

## 2018-08-14 NOTE — DISCHARGE INSTRUCTIONS
Discharge Instructions    Discharged to home by taxi with relative. Discharged via wheelchair, hospital escort: Yes.  Special equipment needed: Not Applicable    Be sure to schedule a follow-up appointment with your primary care doctor or any specialists as instructed.     Discharge Plan:   Diet Plan: Discussed  Activity Level: Discussed  Confirmed Follow up Appointment: Patient to Call and Schedule Appointment  Medication Reconciliation Updated: Yes  Influenza Vaccine Indication: Indicated: Not available from distributor/    I understand that a diet low in cholesterol, fat, and sodium is recommended for good health. Unless I have been given specific instructions below for another diet, I accept this instruction as my diet prescription.   Other diet: Cardiac    Special Instructions: Diagnosis:  Acute Coronary Syndrome (ACS) is a diagnosis that encompasses cardiac-related chest pain and heart attack. ACS occurs when the blood flow to the heart muscle is severely reduced or cut off completely due to a slow process called atherosclerosis.  Atherosclerosis is a disease in which the coronary arteries become narrow from a buildup of fat, cholesterol, and other substances that combine to form plaque. If the plaque breaks, a blood clot will form and block the blood flow to the heart muscle. This lack of blood flow can cause damage or death to the heart muscle which is called a heart attack or Myocardial Infarction (MI). There are two different types of MIs:  ST Elevation Myocardial Infarction or STEMI (the most severe type of heart attack) and Non-ST Elevation Myocardial Infarction or NSTEMI.    Treatment Plan:  · Cardiac Diet  - Low fat, low salt, low cholesterol   · Cardiac Rehab  - Your doctor has ordered you a referral to [facility].  Call [phone] to schedule an appointment.  · Attend my follow-up appointment with my Cardiologist.  · Take my medications as prescribed by my doctor  · Exercise  daily  · Lower my bad cholesterol and raise my good cholesterol, lower my blood pressure and Reduce stress    Medications:  Certain medications are used to treat ACS.  Remember to always take medications as prescribed and never stop talking medications unless told by your doctor.    You have been prescribed the following medicatons:    Aspirin - Aspirin is used as a blood thinning medication and you will require this medication indefinitely.  Beta-Blocker - Beta-Blocker Carvedilol is used to lower blood pressure and heart rate, and/or helps your heart heal after a heart attack.  Statin - Statin atorvastatin is used to lower cholesterol.    · Is patient discharged on Warfarin / Coumadin?   No     Depression / Suicide Risk    As you are discharged from this [facility] facility, it is important to learn how to keep safe from harming yourself.    Recognize the warning signs:  · Abrupt changes in personality, positive or negative- including increase in energy   · Giving away possessions  · Change in eating patterns- significant weight changes-  positive or negative  · Change in sleeping patterns- unable to sleep or sleeping all the time   · Unwillingness or inability to communicate  · Depression  · Unusual sadness, discouragement and loneliness  · Talk of wanting to die  · Neglect of personal appearance   · Rebelliousness- reckless behavior  · Withdrawal from people/activities they love  · Confusion- inability to concentrate     If you or a loved one observes any of these behaviors or has concerns about self-harm, here's what you can do:  · Talk about it- your feelings and reasons for harming yourself  · Remove any means that you might use to hurt yourself (examples: pills, rope, extension cords, firearm)  · Get professional help from the community (Mental Health, Substance Abuse, psychological counseling)  · Do not be alone:Call your Safe Contact- someone whom you trust who will be there for you.  · Call your local  CRISIS HOTLINE 119-8891 or 954-646-7128  · Call your local Children's Mobile Crisis Response Team [place] (552) 489-7556 or www.[employer]  · Call the toll free National Suicide Prevention Hotlines   · National Suicide Prevention Lifeline 426-546-IPMF (3195)  · [employer] Line Network 800-SUICIDE (604-0521)      Heart Attack  A heart attack (myocardial infarction, MI) causes damage to the heart that cannot be fixed. A heart attack often happens when a blood clot or other blockage cuts blood flow to the heart. When this happens, certain areas of the heart begin to die. This causes the pain you feel during a heart attack.  Follow these instructions at home:  · Take medicine as told by your doctor. You may need medicine to:  ¨ Keep your blood from clotting too easily.  ¨ Control your blood pressure.  ¨ Lower your cholesterol.  ¨ Control abnormal heart rhythms.  · Change certain behaviors as told by your doctor. This may include:  ¨ Quitting smoking.  ¨ Being active.  ¨ Eating a heart-healthy diet. Ask your doctor for help with this diet.  ¨ Keeping a healthy weight.  ¨ Keeping your diabetes under control.  ¨ Lessening stress.  ¨ Limiting how much alcohol you drink.  Do not take these medicines unless your doctor says that you can:  · Nonsteroidal anti-inflammatory drugs (NSAIDs). These include:  ¨ Ibuprofen.  ¨ Naproxen.  ¨ Celecoxib.  · Vitamin supplements that have vitamin A, vitamin E, or both.  · Hormone therapy that contains estrogen with or without progestin.  Get help right away if:  · You have sudden chest discomfort.  · You have sudden discomfort in your:  ¨ Arms.  ¨ Back.  ¨ Neck.  ¨ Jaw.  · You have shortness of breath at any time.  · You have sudden sweating or clammy skin.  · You feel sick to your stomach (nauseous) or throw up (vomit).  · You suddenly get light-headed or dizzy.  · You feel your heart beating fast or skipping beats.  These symptoms may be an emergency. Do not wait to see if  the symptoms will go away. Get medical help right away. Call your local emergency services (911 in the [place] Do not drive yourself to the hospital.   This information is not intended to replace advice given to you by your health care provider. Make sure you discuss any questions you have with your health care provider.  Document Released: 06/18/2013 Document Revised: 05/25/2017 Document Reviewed: 02/20/2015  [employer] Interactive Patient Education © 2017 [employer]

## 2018-08-14 NOTE — CARE PLAN
Problem: Knowledge Deficit  Goal: Knowledge of disease process/condition, treatment plan, diagnostic tests, and medications will improve    Intervention: Assess knowledge level of disease process/condition, treatment plan, diagnostic tests, and medications  Educate on disease process, treatment plan, diagnostic tests, meds, assess learning ability and best time to learn

## 2018-08-14 NOTE — PROGRESS NOTES
Patient discharged home with family. Discharge teaching completed, no further questions or concerns. All personal belongings with patient. Patient aaox4. No signs of distress. All lines removed, tele box removed and placed in monitor room.

## 2018-08-14 NOTE — DISCHARGE SUMMARY
Discharge Summary    CHIEF COMPLAINT ON ADMISSION  N/V/sternal fullness/pressure    Reason for Admission  NSTEMI     Admission Date  8/12/2018    CODE STATUS  Full Code    HPI & HOSPITAL COURSE  This is a 77 y.o. 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  She reports the onset of not feeling well, which she describes as a chest pressure in the center of her chest, without radiation.  She said it felt like club soda that she could not relieve.  The symptoms occurred at rest while sitting in back seat of car driving around.  She denies any exacerbating or alleviating factors.  She had no nausea or vomiting.  Due to the ongoing feelings, she presented to [facility], where she was evaluated.  At that time, she was noted to have an elevated troponin, and requiring cardiology consultation.  She was subsequently sent to this facility for higher level of care and cardiology consultation.  Her troponin was elevated at max of 1.11 with subsequent drop to 0.36. Her EKGs showed NSTEMI.  Her symptoms subsided, NM stress testing had partially reversible infarcts seen.  Dr. Bryan Hdez evaluated and due to the severe kidney disease and 1 remaining kidney, opted for medical treatment of her NSTEMI.  Her blood pressure was noted to be elevated despite increase of her norvasc from 5 to 10mg po daily.  She was started on lipitor, coreg in addition to norvasc and ASA.  Her EF was normal at 55%.  Her torsemide should be on a PRN basis if increase in weight of 3# take torsemide.  Her abdominal symptoms improved, repeat abdominal xrays normal.  She was eating and drinking normally.  She was felt safe for dc home with family to [place], then home to [place].  SHe was  on RA, VSS, SR on monitor 73-75. Cr improved from 2.7 to 2.07 with holding torsemide overnight.      Patient discharge delayed due to ride home    Patient discharged on 8/14    Therefore, she is discharged in good and stable condition to home with close outpatient follow-up.    The patient recovered much more quickly than anticipated on admission.    Discharge Date  8/14/2018    FOLLOW UP ITEMS POST DISCHARGE  Follow up with Dr. Bryan Hdez or cardiologist in [place] in 2 weeks for recheck.    Follow up with own nephrologist in 3 weeks.    DISCHARGE DIAGNOSES  Active Problems:    NSTEMI (non-ST elevated myocardial infarction) ([facility]) POA: Yes    Essential hypertension POA: Yes    Class 2 obesity due to excess calories without serious comorbidity with body mass index (BMI) of 37.0 to 37.9 in adult POA: Yes    Acute renal failure superimposed on stage 4 chronic kidney disease (HCC) POA: Yes    S/p nephrectomy POA: Yes  Resolved Problems:    * No resolved hospital problems. *      FOLLOW UP  No future appointments.  No follow-up provider specified.    MEDICATIONS ON DISCHARGE     Medication List      START taking these medications      Instructions   aspirin 81 MG EC tablet   Take 1 Tab by mouth every day.  Dose:  81 mg     atorvastatin 40 MG Tabs  Commonly known as:  LIPITOR   Take 1 Tab by mouth every evening.  Dose:  40 mg     carvedilol 3.125 MG Tabs  Commonly known as:  COREG   Take 1 Tab by mouth 2 times a day, with meals.  Dose:  3.125 mg        CHANGE how you take these medications      Instructions   amLODIPine 10 MG Tabs  What changed:  · medication strength  · how much to take  Commonly known as:  NORVASC   Take 1 Tab by mouth every day.  Dose:  10 mg        CONTINUE taking these medications      Instructions   allopurinol 300 MG Tabs  Commonly known as:  ZYLOPRIM   Take 300 mg by mouth every day.  Dose:  300 mg     calcitRIOL 0.25 MCG Caps  Commonly known as:  ROCALTROL   Take 0.25 mcg by mouth every  day.  Dose:  0.25 mcg     magnesium gluconate 500 MG tablet  Commonly known as:  MAG-G   Take 500 mg by mouth every day.  Dose:  500 mg        STOP taking these medications    cephALEXin 250 MG Caps  Commonly known as:  KEFLEX     diphenoxylate-atropine 2.5-0.025 MG Tabs  Commonly known as:  LOMOTIL     torsemide 5 MG Tabs  Commonly known as:  DEMADEX            Allergies  Allergies   Allergen Reactions   • Contrast Media With Iodine [Iodine] Swelling     Angioedema   • Penicillins Rash     Rash     • Codeine Rash     Rash     • Tape Rash     Rash         DIET  Orders Placed This Encounter   Procedures   • Diet Order Renal, Cardiac     Standing Status:   Standing     Number of Occurrences:   1     Order Specific Question:   Diet:     Answer:   Renal [8]     Order Specific Question:   Diet:     Answer:   Cardiac [6]       ACTIVITY  As tolerated.  Weight bearing as tolerated    CONSULTATIONS  Dr. Ramirez    PROCEDURES    Transthoracic  Echo Report      Echocardiography Laboratory    CONCLUSIONS  Left ventricular ejection fraction is visually estimated to be 55%.  Hypokinesis of the inferior wall.  Normal inferior vena cava size and inspiratory collapse.  Mild aortic and mitral regurgitation.  Ascending aorta is borderline dilated with a diameter of 3.8 cm.      ALBA NINO  Exam Date:         08/13/2018                Myocardial Perfusion   Report   NUCLEAR IMAGING INTERPRETATION   Partially reversible defects most consistent with infarcts with alberto-infarct    ischemia.      ALBA NINO     MRN:    [id number]         Gender:    F     Exam Date: 08/13/2018 10:38    LABORATORY  Lab Results   Component Value Date    SODIUM 142 08/13/2018    POTASSIUM 4.0 08/13/2018    CHLORIDE 114 (H) 08/13/2018    CO2 20 08/13/2018    GLUCOSE 109 (H) 08/13/2018    BUN 40 (H) 08/13/2018    CREATININE 2.07 (H) 08/13/2018        Lab Results   Component Value Date    WBC 7.2 08/13/2018    HEMOGLOBIN 11.9 (L) 08/13/2018    HEMATOCRIT  38.1 08/13/2018    PLATELETCT 177 08/13/2018        Total time of the discharge process exceeds 32 minutes.

## 2018-08-14 NOTE — CARE PLAN
Problem: Safety  Goal: Will remain free from injury    Intervention: Provide assistance with mobility  Assist with mobility, standby assist, call for help, bed alarm on